# Patient Record
(demographics unavailable — no encounter records)

---

## 2025-01-14 NOTE — HISTORY OF PRESENT ILLNESS
[FreeTextEntry1] : LMP:   VALERIA PINZON is a 23 y/o here for a consultation on a cyst to the right ovary. Prior GYN Dr. Fabrice Leon [facility]   Pelvic MRI 10/23/24 (David): Uterus: The uterus is anteverted and anteflexed and normal in size measuring 8.9 x 4.1 x 4.8 cm. There are not fibroids. The uterus is arcuate in configuration.  Inner myometrial zone: Normal in thickness measuring 5 mm Endometrium: Normal in thickness measuring 12 mm Cervix/Vagina: No significant finding Right Ovary: Enlarged measuring 5.5 x 3.8 x 4.3 cm and demonstrates numerous small peripheral follicles. There is a large septated cyst arising from the superior aspect of the ovary measuring 8.4 x 8.6 x 13.7 cm. Left Ovary: Enlarged measuring 6.9 x 3.4 x 2.5 cm and demonstrates numerous small peripheral follicles. Peritoneum/retroperitoneum: No ascites or peritoneal nodularity. Lymph nodes: no enlarged lymph nodes Bladder: Normal Gastrointestinal: No evidence of bowel obstruction Musculoskeletal: No aggressive osseous lesions  IMPRESSION: Findings concerning for at least partial right ovarian torsion which can be seen in the setting of a large cystic ovarian neoplasm as is present in this patient. Findings consistent with PCOS.  PELVIC US: 25 Comparison: Prior study 10/26/2024 FINDINGS: UTERUS: Anteverted Uterine Size: 9.2 x 4.5 cm x 4.0 cm Volume: 85.3 ml Uterine Texture: Homogeneous. No discrete uterine mass. ENDOMETRIUM: 1.0 cm in thickness which is within normal limits RIGHT OVARY: Size: 4.9 cm x 4.5 cm x 3.8 cm. There is evidence of a simple right ovarian cyst measuring 3.0 x 2.7 x 2.9 cm. There is evidence of a septated right paraovarian cyst measuring 6.9 x 4.8 x 5.5 cm, decreased in size. LEFT OVARY: Size 5.5 cm x 2.8 cm x 3.6 cm. There is evidence of a simple left ovarian cyst measuring 3.8 x 2.8 x 2.4 cm mildly increased in size Vascular flow is present bilaterally.  Health maintenance:  Last pap:  PObHx:  PGynHx: Pelvic pain, dysmenorrhea, menorrhagia, hirsutism  PMH: IBS  PSH:  No surgical hx All: Food allergy - Blackberry, Raspberry Meds: Dicyclomine, Pantoprazole FamHx: Breast CA- Mother HTN, Hyperlipidemia, Heart Disease - Father Social Hx: No tob/ETOH/drug